# Patient Record
Sex: FEMALE | Race: BLACK OR AFRICAN AMERICAN
[De-identification: names, ages, dates, MRNs, and addresses within clinical notes are randomized per-mention and may not be internally consistent; named-entity substitution may affect disease eponyms.]

---

## 2017-01-15 ENCOUNTER — HOSPITAL ENCOUNTER (EMERGENCY)
Dept: HOSPITAL 74 - JER | Age: 42
LOS: 1 days | Discharge: HOME | End: 2017-01-16
Payer: COMMERCIAL

## 2017-01-15 VITALS — TEMPERATURE: 99.4 F | DIASTOLIC BLOOD PRESSURE: 71 MMHG | HEART RATE: 83 BPM | SYSTOLIC BLOOD PRESSURE: 123 MMHG

## 2017-01-15 VITALS — BODY MASS INDEX: 25.4 KG/M2

## 2017-01-15 DIAGNOSIS — J02.9: ICD-10-CM

## 2017-01-15 DIAGNOSIS — B97.89: ICD-10-CM

## 2017-01-15 DIAGNOSIS — O98.511: Primary | ICD-10-CM

## 2017-01-15 DIAGNOSIS — Z3A.08: ICD-10-CM

## 2017-01-15 PROCEDURE — 3E033NZ INTRODUCTION OF ANALGESICS, HYPNOTICS, SEDATIVES INTO PERIPHERAL VEIN, PERCUTANEOUS APPROACH: ICD-10-PCS | Performed by: EMERGENCY MEDICINE

## 2017-01-16 LAB
ANION GAP SERPL CALC-SCNC: 10 MMOL/L (ref 8–16)
CALCIUM SERPL-MCNC: 8.2 MG/DL (ref 8.5–10.1)
CO2 SERPL-SCNC: 24 MMOL/L (ref 21–32)
CREAT SERPL-MCNC: 0.5 MG/DL (ref 0.55–1.02)
DEPRECATED RDW RBC AUTO: 12.8 % (ref 11.6–15.6)
GLUCOSE SERPL-MCNC: 90 MG/DL (ref 74–106)
MCH RBC QN AUTO: 30 PG (ref 25.7–33.7)
MCHC RBC AUTO-ENTMCNC: 32.6 G/DL (ref 32–36)
MCV RBC: 91.9 FL (ref 80–96)
PLATELET # BLD AUTO: 297 K/MM3 (ref 134–434)
PMV BLD: 9.3 FL (ref 7.5–11.1)
WBC # BLD AUTO: 7.9 K/MM3 (ref 4–10)

## 2017-01-16 NOTE — PDOC
History of Present Illness





- General


Chief Complaint: Respiratory


Stated Complaint: FEVER/ 8 WKS


Time Seen by Provider: 01/15/17 23:33


History Source: Patient


Exam Limitations: No Limitations





- History of Present Illness


Initial Comments: 


01/15/17 23:48


Patient is a 41-year-old female history of seasonal allergies, who is 8 weeks 

pregnant complaining of runny nose, body ache, chills since today. States she 

feels like her eyes are on fire in her ears are burning throbbing pain to the 

throat. States that about 9:00 she took her temperature and it was 100.3 she 

took Tylenol at 6 PM without relief of symptoms. Positive flu shot October. She 

denies any abdominal pain, dysuria, vaginal bleeding.





PMD: Dr. Carias


OBS: Dr. Osorio


PMHx:  As above


PSocHx: Negative cigarette, neg etoh, neg drugs


FamHX:  Noncontributory


ALL:  PCN











GENERAL/CONSTITUTIONAL: [No fever or chills. No weakness. No weight change.]


HEAD, EYES, EARS, NOSE AND THROAT: [No change in vision. No ear pain or 

discharge. No sore throat, (+) rhinorrhea.]


CARDIOVASCULAR: [No chest pain or shortness of breath.]


RESPIRATORY: [No cough, wheezing, or hemoptysis.]


GASTROINTESTINAL: [No nausea, vomiting, diarrhea or constipation. No rectal 

bleeding.]


GENITOURINARY: [No dysuria, frequency, or change in urination.]


MUSCULOSKELETAL: [No joint pain or swelling (+) muscle pain. No neck or back 

pain.]


SKIN AND BREASTS: [No rash or easy bruising.]


NEUROLOGIC: [No headache, vertigo, loss of consciousness, or loss of sensation.]


PSYCHIATRIC: [No depression or anxiety.]


ENDOCRINE: [No increased thirst. No abnormal weight change.]


HEMATOLOGIC/LYMPHATIC: [No anemia, easy bleeding, or history of blood clots.]


ALLERGIC/IMMUNOLOGIC: [No hives or skin allergy. No latex allergy.]








GENERAL: [The patient is awake, alert, and fully oriented, in mild acute 

distress.]


HEAD: [Normal with no signs of trauma.]


EYES: [Pupils equal, round and reactive to light, extraocular movements intact, 

sclera anicteric, conjunctiva clear.]


ENT: [Ears normal, nares patent, oropharynx mild erythema, without exudates.  

Moist mucous membranes.]


NECK: [Normal range of motion, supple without lymphadenopathy, JVD, or masses.]


LUNGS: [Breath sounds equal, clear to auscultation bilaterally.  No wheezes, 

and no crackles.]


HEART: [Regular rate and rhythm, normal S1 and S2 without murmur, rub.]


ABDOMEN: [Soft, nontender, normoactive bowel sounds.  No guarding, no rebound.  

No masses.]


EXTREMITIES: [Normal range of motion, no edema.  No clubbing or cyanosis. No 

cords, erythema, or tenderness.]


NEUROLOGICAL: [Cranial nerves II through XII grossly intact.  Normal speech, 

normal gait.]


PSYCH: [Normal mood, normal affect.]


SKIN: [Warm, Dry, normal turgor, no rashes or lesions noted.]








Past History





- Past Medical History


Allergies/Adverse Reactions: 


 Allergies











Allergy/AdvReac Type Severity Reaction Status Date / Time


 


Penicillins Allergy   Verified 01/15/17 23:15











Home Medications: 


Ambulatory Orders





NK [No Known Home Medication]  01/16/17 








 Disorders: Yes (VAGINAL HERPES)


Suicide Attempt (Hx): No





- Psycho/Social/Smoking Cessation Hx


Suicidal Ideation: No


Smoking History: Never smoked





*Physical Exam





- Vital Signs


 Last Vital Signs











Temp Pulse Resp BP Pulse Ox


 


 99.4 F   83   18   123/71   100 


 


 01/15/17 23:11  01/15/17 23:11  01/15/17 23:11  01/15/17 23:11  01/15/17 23:11














ED Treatment Course





- LABORATORY


CBC & Chemistry Diagram: 


 01/16/17 01:30





 01/16/17 01:30





Medical Decision Making





- Medical Decision Making


01/16/17 01:34


Patient is a 41-year-old female history of seasonal allergies, who is 8 weeks 

pregnant complaining of runny nose, body ache, chills since today. most likely 

viral illness but to rule out the flu


Tylenol 1 g IV, IV fluids, labs.


Reassess





01/16/17 01:39


Rapid strep negative


influenza negative








 Laboratory Tests











  01/16/17 01/16/17





  01:30 01:30


 


WBC  7.9 


 


Hgb  11.5  D 


 


Hct  35.1  D 


 


Plt Count  297 


 


Sodium   138


 


Potassium   3.8


 


Chloride   104


 


Carbon Dioxide   24


 


Anion Gap   10


 


BUN   7


 


Creatinine   0.5 L














Patient feels better after hydration and tylenol 





I discussed the physical exam findings, ancillary test results and final 

diagnoses with the patient. I answered all of the patient's questions. The 

patient was satisfied with the care received and felt comfortable with the 

discharge plan and treatment plan.  The Patient agrees to follow up with the 

primary care physician within 24-72 hours.














*DC/Admit/Observation/Transfer


Diagnosis at time of Disposition: 


 Viral illness





- Discharge Dispostion


Disposition: HOME


Condition at time of disposition: Stable





- Referrals


Referrals: 


Savanna Carias MD [Primary Care Provider] - 





- Patient Instructions


Printed Discharge Instructions:  DI for Viral Pharyngitis


Additional Instructions: 


Your Discharge Instructions:


You must call primary care physician within 24 hours to arrange follow-up.  

Return to the Emergency Department with any new, persistent or worsening 

symptoms, for fever, chills, SOB, dizziness or any other concerning changes 

that may occur. 








- Post Discharge Activity


Work/School Note:  Back to Work

## 2017-08-23 ENCOUNTER — HOSPITAL ENCOUNTER (INPATIENT)
Dept: HOSPITAL 74 - JLDR | Age: 42
LOS: 3 days | Discharge: HOME | End: 2017-08-26
Attending: OBSTETRICS & GYNECOLOGY | Admitting: OBSTETRICS & GYNECOLOGY
Payer: COMMERCIAL

## 2017-08-23 VITALS — BODY MASS INDEX: 27.4 KG/M2

## 2017-08-23 DIAGNOSIS — Z3A.39: ICD-10-CM

## 2017-08-23 LAB
ANION GAP SERPL CALC-SCNC: 10 MMOL/L (ref 8–16)
BASOPHILS # BLD: 0.4 % (ref 0–2)
CALCIUM SERPL-MCNC: 9.2 MG/DL (ref 8.5–10.1)
CO2 SERPL-SCNC: 23 MMOL/L (ref 21–32)
CREAT SERPL-MCNC: 0.6 MG/DL (ref 0.55–1.02)
DEPRECATED RDW RBC AUTO: 14.8 % (ref 11.6–15.6)
EOSINOPHIL # BLD: 0.7 % (ref 0–4.5)
GLUCOSE SERPL-MCNC: 75 MG/DL (ref 74–106)
INR BLD: 0.96 (ref 0.82–1.09)
MCH RBC QN AUTO: 31.2 PG (ref 25.7–33.7)
MCHC RBC AUTO-ENTMCNC: 34.2 G/DL (ref 32–36)
MCV RBC: 91.2 FL (ref 80–96)
NEUTROPHILS # BLD: 57 % (ref 42.8–82.8)
PLATELET # BLD AUTO: 342 K/MM3 (ref 134–434)
PMV BLD: 8.3 FL (ref 7.5–11.1)
PT PNL PPP: 10.6 SEC (ref 9.98–11.88)
WBC # BLD AUTO: 6 K/MM3 (ref 4–10)

## 2017-08-23 PROCEDURE — 3E0P7GC INTRODUCTION OF OTHER THERAPEUTIC SUBSTANCE INTO FEMALE REPRODUCTIVE, VIA NATURAL OR ARTIFICIAL OPENING: ICD-10-PCS | Performed by: OBSTETRICS & GYNECOLOGY

## 2017-08-23 NOTE — HP
Past Medical History





- Admission


Chief Complaint: Elective induction


History of Present Illness: 


42 yo  @ 39 weeks gestation, presents for induction of labor.


EDC 17


She denies any vaginal bleeding nor ROM.


History Source: Patient


Limitations to Obtaining History: No Limitations





- Past Medical History


...: 2


...Para: 1


...Term: 1


...: 0


...Spon : 0


...Induced : 0


...Multiple Gestation: 0


...EDC by Sono: 17





- Past Surgical History


Past Surgical History: Yes: None


Hx Myomectomy: No


Hx Transabdominal Cerclage: No





- Smoking History


Smoking history: Never smoked


Have you smoked in the past 12 months: No





- Alcohol/Substance Use


Hx Alcohol Use: No


History of Substance Use: reports: None





- Social History


Usual Living Arrangement: Yes: With Significant Other


History of Recent Travel: No





Home Medications





- Allergies


Allergies/Adverse Reactions: 


 Allergies











Allergy/AdvReac Type Severity Reaction Status Date / Time


 


Penicillins Allergy   Verified 01/15/17 23:15














- Home Medications


Home Medications: 


Ambulatory Orders





Prenatal Tablet 1 tablet PO DAILY 17 











Family Disease History





- Family Disease History


Family History: Unremarkable





Review of Systems





- Review of Systems


Constitutional: reports: No Symptoms


Eyes: reports: No Symptoms


HENT: reports: No Symptoms


Neck: reports: No Symptoms


Cardiovascular: reports: No Symptoms


Respiratory: reports: No Symptoms


Gastrointestinal: reports: No Symptoms


Genitourinary: reports: No Symptoms


Breasts: reports: No Symptoms Reported


Musculoskeletal: reports: No Symptoms


Integumentary: reports: No Symptoms


Neurological: reports: No Symptoms


Endocrine: reports: No Symptoms


Hematology/Lymphatic: reports: No Symptoms


Psychiatric: reports: No Symptoms


Pain Intensity: 0





Physical Exam - Maternity


Vital Signs: 


 Vital Signs











Temperature  98.2 F   17 22:00


 


Pulse Rate  85   17 22:00


 


Respiratory Rate  18   17 22:00


 


Blood Pressure  115/73   17 22:00


 


O2 Sat by Pulse Oximetry (%)      











Constitutional: Yes: Well Nourished


Eyes: Yes: Conjunctiva Clear


HENT: Yes: Atraumatic


Neck: Yes: Supple


Cardiovascular: Yes: Regular Rate and Rhythm


Lungs: Clear to auscultation





- Abdominal Exam/OB


Number of Fetuses: Single


Fetal Presentation: Vertex


Contractions: No





- Vaginal Exam/OB


Vaginal Bleediing: No


Dilatation (cm): 0


Effacement (%): 60


Fetal Station: -2





- Physical Exam


Musculoskeletal: Yes: WNL


Extremities: Yes: WNL


...Motor Strength: WNL


Psychiatric: Yes: Alert, Oriented





- Labs


Lab Results: 


 CBC, BMP





 17 20:50 





 17 20:50 











Problem List





- Problems


(1) Pregnancy


Code(s): Z34.90 - ENCNTR FOR SUPRVSN OF NORMAL PREGNANCY, UNSP, UNSP TRIMESTER 

  Qualifiers: 


     Weeks of gestation: 39 weeks        Qualified Code(s): Z3A.39 - 39 weeks 

gestation of pregnancy  





(2) Advanced maternal age (AMA) in pregnancy


Code(s): QEQ5929 - 








Assessment/Plan


Advance maternal age in pregnancy


Admit for cervidil induction


Re-evaluate in 12 hours or before if indicated

## 2017-08-24 RX ADMIN — FERROUS SULFATE TAB EC 324 MG (65 MG FE EQUIVALENT) SCH MG: 324 (65 FE) TABLET DELAYED RESPONSE at 17:30

## 2017-08-24 RX ADMIN — DOCUSATE SODIUM,SENNOSIDES PRN TABLET: 50; 8.6 TABLET, FILM COATED ORAL at 21:47

## 2017-08-24 RX ADMIN — CLINDAMYCIN PHOSPHATE SCH MLS/HR: 600 INJECTION, SOLUTION INTRAVENOUS at 12:45

## 2017-08-24 RX ADMIN — CLINDAMYCIN PHOSPHATE SCH: 600 INJECTION, SOLUTION INTRAVENOUS at 18:45

## 2017-08-24 NOTE — PN
Delivery





- Delivery


Vaginal Delivery: Spontaneous


Type of Anesthesia: None


Episiotomy/Laceration: None


EBL (cc): 350





Delivery, Single Birth





- Stages of Labor


Date 1st Stage Initiatied: 17


Time 1st Stage Initiated: 10:00


Date 2nd Stage Initiated: 17


Time 2nd Stage Initiated: 14:00


Date of Delivery: 17


Time of Delivery: 14:21


Time Placenta Delivered: 14:26





- Condition of Infant


Pediatrician/Neonatologist Present: No


Name: Jorge L Anthony


Infant Gender: Male


Position: Right, OA


Total Hours ROM (Hrs/Mins): 4hr/40mins





- Apgar


  ** 1 Minute


Apgar Total Score: 8





  ** 5 Minutes


Apgar Total Score: 9





-  Feeding Plan


Initial Plan: Elected not to breastfeed exclusively throughout hospitalization

## 2017-08-25 LAB
BASOPHILS # BLD: 0.2 % (ref 0–2)
DEPRECATED RDW RBC AUTO: 14.8 % (ref 11.6–15.6)
EOSINOPHIL # BLD: 0.5 % (ref 0–4.5)
MCH RBC QN AUTO: 31.2 PG (ref 25.7–33.7)
MCHC RBC AUTO-ENTMCNC: 34.3 G/DL (ref 32–36)
MCV RBC: 91 FL (ref 80–96)
NEUTROPHILS # BLD: 73.2 % (ref 42.8–82.8)
PLATELET # BLD AUTO: 246 K/MM3 (ref 134–434)
PMV BLD: 7.9 FL (ref 7.5–11.1)
WBC # BLD AUTO: 11.2 K/MM3 (ref 4–10)

## 2017-08-25 RX ADMIN — Medication SCH TAB: at 09:08

## 2017-08-25 RX ADMIN — DOCUSATE SODIUM,SENNOSIDES PRN TABLET: 50; 8.6 TABLET, FILM COATED ORAL at 20:15

## 2017-08-25 RX ADMIN — FERROUS SULFATE TAB EC 324 MG (65 MG FE EQUIVALENT) SCH MG: 324 (65 FE) TABLET DELAYED RESPONSE at 17:46

## 2017-08-25 RX ADMIN — FERROUS SULFATE TAB EC 324 MG (65 MG FE EQUIVALENT) SCH MG: 324 (65 FE) TABLET DELAYED RESPONSE at 11:52

## 2017-08-25 RX ADMIN — FERROUS SULFATE TAB EC 324 MG (65 MG FE EQUIVALENT) SCH MG: 324 (65 FE) TABLET DELAYED RESPONSE at 09:08

## 2017-08-26 VITALS — TEMPERATURE: 98.5 F | HEART RATE: 79 BPM | DIASTOLIC BLOOD PRESSURE: 86 MMHG | SYSTOLIC BLOOD PRESSURE: 121 MMHG

## 2017-08-26 RX ADMIN — FERROUS SULFATE TAB EC 324 MG (65 MG FE EQUIVALENT) SCH MG: 324 (65 FE) TABLET DELAYED RESPONSE at 08:45

## 2017-08-26 RX ADMIN — Medication SCH TAB: at 09:45

## 2017-08-26 RX ADMIN — DOCUSATE SODIUM,SENNOSIDES PRN TABLET: 50; 8.6 TABLET, FILM COATED ORAL at 09:44

## 2017-08-26 NOTE — DS
Physical Exam-GYN


Vital Signs: 


 Vital Signs











Temperature  98.5 F   17 08:05


 


Pulse Rate  79   17 08:05


 


Respiratory Rate  20   17 08:05


 


Blood Pressure  121/86   17 08:05


 


O2 Sat by Pulse Oximetry (%)  98   17 16:31











Constitutional: Yes: Well Nourished


Eyes: Yes: Conjunctiva Clear


HENT: Yes: Atraumatic


Neck: Yes: Supple, Trachea Midline


Cardiovascular: Yes: Regular Rate and Rhythm


Respiratory: Yes: Regular, CTA Bilaterally


Gastrointestinal: Yes: Normal Bowel Sounds


External Genitalia: Yes: Normal


Vaginal Exam: Yes: Normal


....Post Partum: Yes: Uterus firm


Breast(s): Yes: WNL


Neurological: Yes: Alert, Oriented


...Motor Strength: WNL


Psychiatric: Yes: Alert, Oriented


Labs: 


 CBC, BMP





 17 06:00 





 17 20:50 











Delivery





- Delivery


Vaginal Delivery: Spontaneous


Type of Anesthesia: None


Episiotomy/Laceration: None


EBL (cc): 350





Delivery, Single Birth





- Stages of Labor


Date 1st Stage Initiatied: 17


Time 1st Stage Initiated: 10:00


Date 2nd Stage Initiated: 17


Time 2nd Stage Initiated: 14:00


Date of Delivery: 17


Time of Delivery: 14:21


Time Placenta Delivered: 14:26





- Condition of Infant


Pediatrician/Neonatologist Present: No


Name: Jorge L Anthony


Infant Gender: Male


Birth Weight: 8 lb 6 oz


Position: Right, OA


Total Hours ROM (Hrs/Mins): 4hr/40mins





- Apgar


  ** 1 Minute


Apgar Total Score: 8





  ** 5 Minutes


Apgar Total Score: 9





-  Feeding Plan


Initial Plan: Elected not to breastfeed exclusively throughout hospitalization





Remarks





- Remarks


Remarks: 


Normal spontaneous vaginal delivery of a live infant boy over intact perineum.


Nose / Oropharynx suctioned @ perineum.


Cord clamped and cut.


Placenta expelled spontaneously intact.


Mother in stable condition.





Discharge Summary


Reason For Visit: INDUCTION


Current Active Problems





Advanced maternal age (AMA) in pregnancy (Acute) 


Anemia (Acute) 


Anemia complicating puerperium (Acute) 


Normal vaginal delivery (Acute) 


Pregnancy (Acute) 


Status post normal vaginal delivery (Acute) 








Procedures: Principal: Normal spontaneous vaginal delivery


Hospital Course: 


Routine postpartum care


Condition: Good





- Instructions


Diet, Activity, Other Instructions: 


return to MD office in 6weeks, call office for an appointment.


Referrals: 


Kellie Coffman MD [Staff Physician] - 


Disposition: HOME





- Home Medications


Comprehensive Discharge Medication List: 


Ambulatory Orders





Prenatal Tablet 1 tablet PO DAILY 17

## 2018-11-12 ENCOUNTER — HOSPITAL ENCOUNTER (INPATIENT)
Dept: HOSPITAL 74 - JLDR | Age: 43
LOS: 3 days | Discharge: HOME | End: 2018-11-15
Attending: OBSTETRICS & GYNECOLOGY | Admitting: OBSTETRICS & GYNECOLOGY
Payer: COMMERCIAL

## 2018-11-12 VITALS — BODY MASS INDEX: 28.7 KG/M2

## 2018-11-12 DIAGNOSIS — Z3A.37: ICD-10-CM

## 2018-11-12 LAB
ANION GAP SERPL CALC-SCNC: 9 MMOL/L (ref 8–16)
APTT BLD: 28.4 SECONDS (ref 25.2–36.5)
BASOPHILS # BLD: 0.2 % (ref 0–2)
BUN SERPL-MCNC: 5 MG/DL (ref 7–18)
CALCIUM SERPL-MCNC: 8.2 MG/DL (ref 8.5–10.1)
CHLORIDE SERPL-SCNC: 106 MMOL/L (ref 98–107)
CO2 SERPL-SCNC: 24 MMOL/L (ref 21–32)
CREAT SERPL-MCNC: 0.4 MG/DL (ref 0.55–1.3)
DEPRECATED RDW RBC AUTO: 14.4 % (ref 11.6–15.6)
EOSINOPHIL # BLD: 0.7 % (ref 0–4.5)
GLUCOSE SERPL-MCNC: 95 MG/DL (ref 74–106)
HCT VFR BLD CALC: 33.1 % (ref 32.4–45.2)
HGB BLD-MCNC: 11.3 GM/DL (ref 10.7–15.3)
INR BLD: 1.02 (ref 0.83–1.09)
LYMPHOCYTES # BLD: 31.4 % (ref 8–40)
MCH RBC QN AUTO: 31.6 PG (ref 25.7–33.7)
MCHC RBC AUTO-ENTMCNC: 34.3 G/DL (ref 32–36)
MCV RBC: 92.2 FL (ref 80–96)
MONOCYTES # BLD AUTO: 7.1 % (ref 3.8–10.2)
NEUTROPHILS # BLD: 60.6 % (ref 42.8–82.8)
PLATELET # BLD AUTO: 324 K/MM3 (ref 134–434)
PMV BLD: 8.3 FL (ref 7.5–11.1)
POTASSIUM SERPLBLD-SCNC: 3.6 MMOL/L (ref 3.5–5.1)
PT PNL PPP: 12 SEC (ref 9.7–13)
RBC # BLD AUTO: 3.59 M/MM3 (ref 3.6–5.2)
SODIUM SERPL-SCNC: 139 MMOL/L (ref 136–145)
WBC # BLD AUTO: 6.2 K/MM3 (ref 4–10)

## 2018-11-12 PROCEDURE — G0008 ADMIN INFLUENZA VIRUS VAC: HCPCS

## 2018-11-12 NOTE — HP
Past Medical History





- Admission


Chief Complaint: Contractions


History of Present Illness: 





42 yo  @ 37.6 weeks gestation, EDC 18, admitted for contractions pain.


Upon admission she was 5cm dilated.


History Source: Patient


Limitations to Obtaining History: No Limitations





- Past Medical History


...: 3


...Para: 2


...Term: 2


...: 0


...Spon : 0


...Induced : 0


...Multiple Gestation: 0


...LMP: 18


... Weeks Gestation by Dates: 38.2


...EDC by Dates: 18


...EDC by Sono: 18





- Past Surgical History


Past Surgical History: Yes: None


Hx Myomectomy: No


Hx Transabdominal Cerclage: No





- Smoking History


Smoking history: Unknown if ever smoked


Have you smoked in the past 12 months: No





- Alcohol/Substance Use


Hx Alcohol Use: No


History of Substance Use: reports: None





- Social History


Usual Living Arrangement: Yes: With Significant Other


History of Recent Travel: No





Home Medications





- Allergies


Allergies/Adverse Reactions: 


 Allergies











Allergy/AdvReac Type Severity Reaction Status Date / Time


 


Penicillins Allergy   Verified 18 18:37














- Home Medications


Home Medications: 


Ambulatory Orders





Prenatal Tablet 1 tablet PO DAILY 17 











Family Disease History





- Family Disease History


Family History: Unremarkable





Review of Systems





- Review of Systems


Constitutional: reports: No Symptoms


Eyes: reports: No Symptoms


Neck: reports: No Symptoms


Cardiovascular: reports: No Symptoms


Respiratory: reports: No Symptoms


Gastrointestinal: reports: No Symptoms


Genitourinary: reports: Pain


Breasts: reports: No Symptoms Reported


Musculoskeletal: reports: No Symptoms


Integumentary: reports: No Symptoms


Neurological: reports: No Symptoms


Endocrine: reports: No Symptoms


Hematology/Lymphatic: reports: No Symptoms


Psychiatric: reports: No Symptoms


Pain Intensity: 5





Physical Exam - Maternity


Vital Signs: 


 Vital Signs











Temperature  98.2 F   18 17:45


 


Pulse Rate  84   18 20:00


 


Respiratory Rate  20   18 20:00


 


Blood Pressure  112/51 L  18 20:00


 


O2 Sat by Pulse Oximetry (%)      











Constitutional: Yes: Well Nourished


Eyes: Yes: Conjunctiva Clear


HENT: Yes: Atraumatic


Neck: Yes: Supple


Cardiovascular: Yes: Regular Rate and Rhythm


Lungs: Clear to auscultation





- Abdominal Exam/OB


Number of Fetuses: Single


Fetal Presentation: Vertex


Contractions: No


Regularity: Irregular


Intensity: Mild





- Vaginal Exam/OB


Dilatation (cm): 5


Effacement (%): 80


Amniotic Membrane Status: Intact


Fetal Station: -2





- Physical Exam


Musculoskeletal: Yes: WNL


Extremities: Yes: WNL


...Motor Strength: WNL


Psychiatric: Yes: Alert, Oriented





- Labs


Lab Results: 


 CBC, BMP





 18 19:20 





 18 19:20 











Problem List





- Problems


(1) Pain during labor


Code(s): O99.89 - OTH DISEASES AND CONDITIONS COMPL PREG/CHLDBRTH; R52 - PAIN, 

UNSPECIFIED   





Assessment/Plan





Labor pain


Admit to L&D


Analgesia as needed


anticipate

## 2018-11-13 RX ADMIN — Medication SCH TAB: at 10:00

## 2018-11-13 RX ADMIN — FERROUS SULFATE TAB EC 324 MG (65 MG FE EQUIVALENT) SCH MG: 324 (65 FE) TABLET DELAYED RESPONSE at 17:46

## 2018-11-13 RX ADMIN — FERROUS SULFATE TAB EC 324 MG (65 MG FE EQUIVALENT) SCH MG: 324 (65 FE) TABLET DELAYED RESPONSE at 08:00

## 2018-11-13 RX ADMIN — FERROUS SULFATE TAB EC 324 MG (65 MG FE EQUIVALENT) SCH MG: 324 (65 FE) TABLET DELAYED RESPONSE at 12:18

## 2018-11-13 NOTE — PN
Delivery





- Delivery


Vaginal Delivery: Spontaneous


Episiotomy/Laceration: None


EBL (cc): 250





Delivery, Single Birth





-  Feeding Plan


Initial Plan: Elected not to breastfeed exclusively throughout hospitalization





Remarks





- Remarks


Remarks: 





Normal spontaneous vaginal delivery of a live infant boy over intact perineum.


Nose / Oropharynx suctioned @ perineum.


Cord clamped and cut.


Placenta expelled spontaneously intact.


Baby handed to nurse.


Mother in stable condition.

## 2018-11-14 LAB
BASOPHILS # BLD: 0.3 % (ref 0–2)
DEPRECATED RDW RBC AUTO: 14.8 % (ref 11.6–15.6)
EOSINOPHIL # BLD: 1 % (ref 0–4.5)
HCT VFR BLD CALC: 29.4 % (ref 32.4–45.2)
HGB BLD-MCNC: 9.7 GM/DL (ref 10.7–15.3)
LYMPHOCYTES # BLD: 26.7 % (ref 8–40)
MCH RBC QN AUTO: 30.8 PG (ref 25.7–33.7)
MCHC RBC AUTO-ENTMCNC: 33.1 G/DL (ref 32–36)
MCV RBC: 93 FL (ref 80–96)
MONOCYTES # BLD AUTO: 6 % (ref 3.8–10.2)
NEUTROPHILS # BLD: 66 % (ref 42.8–82.8)
PLATELET # BLD AUTO: 265 K/MM3 (ref 134–434)
PMV BLD: 8.3 FL (ref 7.5–11.1)
RBC # BLD AUTO: 3.16 M/MM3 (ref 3.6–5.2)
WBC # BLD AUTO: 8.1 K/MM3 (ref 4–10)

## 2018-11-14 RX ADMIN — FERROUS SULFATE TAB EC 324 MG (65 MG FE EQUIVALENT) SCH MG: 324 (65 FE) TABLET DELAYED RESPONSE at 17:54

## 2018-11-14 RX ADMIN — FERROUS SULFATE TAB EC 324 MG (65 MG FE EQUIVALENT) SCH MG: 324 (65 FE) TABLET DELAYED RESPONSE at 08:15

## 2018-11-14 RX ADMIN — FERROUS SULFATE TAB EC 324 MG (65 MG FE EQUIVALENT) SCH: 324 (65 FE) TABLET DELAYED RESPONSE at 12:20

## 2018-11-14 RX ADMIN — Medication SCH TAB: at 09:54

## 2018-11-15 VITALS — TEMPERATURE: 98.7 F

## 2018-11-15 VITALS — SYSTOLIC BLOOD PRESSURE: 138 MMHG | HEART RATE: 90 BPM | DIASTOLIC BLOOD PRESSURE: 92 MMHG

## 2018-11-15 RX ADMIN — FERROUS SULFATE TAB EC 324 MG (65 MG FE EQUIVALENT) SCH MG: 324 (65 FE) TABLET DELAYED RESPONSE at 08:02

## 2018-11-15 RX ADMIN — Medication SCH TAB: at 09:02

## 2018-11-15 NOTE — DS
Physical Exam-GYN


Vital Signs: 


 Vital Signs











Temperature  98.7 F   18 21:00


 


Pulse Rate  80   18 21:00


 


Respiratory Rate  18   18 21:00


 


Blood Pressure  118/74   18 21:00


 


O2 Sat by Pulse Oximetry (%)      











Labs: 


 CBC, BMP





 18 06:30 





 18 19:20 











Delivery





- Delivery


Vaginal Delivery: Spontaneous


Type of Anesthesia: None


Episiotomy/Laceration: None


EBL (cc): 250





Delivery, Single Birth





- Stages of Labor


Date 1st Stage Initiatied: 18


Time 1st Stage Initiated: 22:15


Date 2nd Stage Initiated: 18


Time 2nd Stage Initiated: 00:10


Date of Delivery: 18


Time of Delivery: 00:22


Time Placenta Delivered: 00:24





- Condition of Infant


Pediatrician/Neonatologist Present: No


Infant Gender: Male


Birth Weight: 6 lb 11.8 oz


Position: Left, OA


Total Hours ROM (Hrs/Mins): 3HOURS/4min





- Apgar


  ** 1 Minute


Apgar Total Score: 9





  ** 5 Minutes


Apgar Total Score: 9





-  Feeding Plan


Initial Plan: Elected not to breastfeed exclusively throughout hospitalization





Discharge Summary


Reason For Visit: LABOR


Current Active Problems





Pain during labor (Acute)








Condition: Good





- Instructions


Diet, Activity, Other Instructions: 





Physical activity





Resume your normal everyday activity as tolerated no heavy lifting or exercise 

until seen by your doctor. You may walk unlimited amounts and climb stairs. You 

may resume driving the car when you feel safe and comfortable behind the wheel. 

No sexual activity as instructed for 6 weeks. 





Wound care


If you have stitches, they will dissolve.   DO NOT attempt to remove them.   

You may shower daily, do not soak in tubs/baths/pools for 6 weeks. 





Diet


There are no dietary restrictions. Eat healthy, high-fiber foods. Drink 6 to 8 

glasses of liquid each day. This will assist in keeping your bowels regular.





Pain management


You may take Tylenol or Ibuprofen (for example, Motrin, Advil etc.) as needed 

for pain. 





Call MD for any of the following:





Severe pain not relieved by medication


Fever of 101 or higher


Excessive bleeding or drainage on dressing


Inability to urinate

















Disposition: HOME





- Home Medications


Comprehensive Discharge Medication List: 


Ambulatory Orders





Prenatal Tablet 1 tablet PO DAILY 17

## 2019-01-31 ENCOUNTER — HOSPITAL ENCOUNTER (OUTPATIENT)
Dept: HOSPITAL 74 - JASU-SURG | Age: 44
Discharge: HOME | End: 2019-01-31
Attending: OBSTETRICS & GYNECOLOGY
Payer: COMMERCIAL

## 2019-01-31 VITALS — HEART RATE: 66 BPM | DIASTOLIC BLOOD PRESSURE: 89 MMHG | SYSTOLIC BLOOD PRESSURE: 134 MMHG

## 2019-01-31 VITALS — BODY MASS INDEX: 25.7 KG/M2

## 2019-01-31 VITALS — TEMPERATURE: 97.9 F

## 2019-01-31 DIAGNOSIS — Z30.2: Primary | ICD-10-CM

## 2019-01-31 PROCEDURE — 0UL74ZZ OCCLUSION OF BILATERAL FALLOPIAN TUBES, PERCUTANEOUS ENDOSCOPIC APPROACH: ICD-10-PCS | Performed by: OBSTETRICS & GYNECOLOGY

## 2019-01-31 NOTE — OP
Operative Note





- Note:


Operative Date: 01/31/19


Pre-Operative Diagnosis: Multiparity


Operation: Laparoscopic bilateral tubal ligation


Post-Operative Diagnosis: Same as Pre-op


Surgeon: Kellie Coffman


Anesthesia: General


Estimated Blood Loss (mls): 3

## 2019-01-31 NOTE — HP
Admitting History and Physical





- Admission


Chief Complaint: Multiparity


History of Present Illness: 





42 yo Para 3 is pre op for lap bilateral tubal ligation.


History Source: Patient


Limitations to Obtaining History: No Limitations





- Past Medical History


...LMP: 01/17/19


...Pregnant: No


...Para: 3





- Past Surgical History


Past Surgical History: Yes: None





- Smoking History


Smoking history: Never smoked


Have you smoked in the past 12 months: No





- Alcohol/Substance Use


Hx Alcohol Use: No


History of Substance Use: reports: None





- Social History


History of Recent Travel: No





Home Medications





- Allergies


Allergies/Adverse Reactions: 


 Allergies











Allergy/AdvReac Type Severity Reaction Status Date / Time


 


Penicillins Allergy Unknown  Verified 01/31/19 13:30














- Home Medications


Home Medications: 


Ambulatory Orders





Prenatal Vit 93/Iron Fum/Folic [Prenatal Formula Tablet] 1 each PO DAILY 01/23/ 19 











Family Disease History





- Family Disease History


Family History: Unremarkable





Review of Systems





- Review of Systems


Constitutional: reports: No Symptoms


Eyes: reports: No Symptoms


HENT: reports: No Symptoms


Neck: reports: No Symptoms


Cardiovascular: reports: No Symptoms


Respiratory: reports: No Symptoms


Gastrointestinal: reports: No Symptoms


Genitourinary: reports: No Symptoms


Breasts: reports: No Symptoms Reported


Musculoskeletal: reports: No Symptoms


Integumentary: reports: No Symptoms


Neurological: reports: No Symptoms


Endocrine: reports: No Symptoms


Psychiatric: reports: No Symptoms


Pain Intensity: 0





Physical Examination


Vital Signs: 


 Vital Signs











Temperature  98.2 F   01/31/19 13:33


 


Pulse Rate  68   01/31/19 13:33


 


Respiratory Rate  16   01/31/19 13:33


 


Blood Pressure  134/93   01/31/19 13:33


 


O2 Sat by Pulse Oximetry (%)  99   01/31/19 13:33











Constitutional: Yes: Well Nourished


Eyes: Yes: Conjunctiva Clear


HENT: Yes: Atraumatic


Neck: Yes: Supple


Cardiovascular: Yes: Regular Rate and Rhythm


Respiratory: Yes: Regular


Gastrointestinal: Yes: Normal Bowel Sounds


Breast(s): Yes: WNL


Musculoskeletal: Yes: WNL


Extremities: Yes: WNL


Neurological: Yes: Alert, Oriented


...Motor Strength: WNL


Psychiatric: Yes: Alert, Oriented





Problem List





- Problems


(1) Multiparity


Code(s): Z64.1 - PROBLEMS RELATED TO MULTIPARITY   





Assessment/Plan





Multiparity


Pre op for Lap BTL


Consent signed


Anesthesia to see patient

## 2019-02-01 NOTE — SURG
Surgery First Assist Note


First Assist: Fuad Dhillon PA-C


Date of Service: 01/31/19


Diagnosis: 


Elective sterilization





Procedure: 


laproscopic salpingectomy





I was present for the entirety of the operative procedure. For further detail, 

please refer to operative report.

## 2019-02-02 NOTE — OP
DATE OF OPERATION:  

 

DATE OF DICTATION:  02/01/2019

 

PREOPERATIVE DIAGNOSIS:  Multiparity, desires permanent sterilization.

 

POSTOPERATIVE DIAGNOSIS:  Multiparity, desires permanent sterilization.

 

PROCEDURE:  Laparoscopic bilateral tubal ligation.

 

SURGEON:  Kellie Coffman MD 

 

ASSISTANT:  Fuad 

 

ANESTHESIA:  General.

 

COMPLICATIONS:  None.

 

ESTIMATED BLOOD LOSS:  Less than 5 mL.

 

DESCRIPTION OF PROCEDURE:  The patient was taken to the operating room where general

anesthesia was administered.  The patient was then placed in lithotomy position.  She

was then prepped and draped in proper sterile fashion.  A weighted speculum was

placed in the vagina.  The anterior lip of the cervix was grasped with a single-tooth

tenaculum.  Then the Castañeda was placed and a HUMI uterine manipulator was inserted

into the uterus as a means to manipulate the uterus.  Attention was then turned to

the abdomen where a 5-mm skin incision was made in the umbilical fold.  A Veress

needle was carefully introduced into the peritoneal cavity while tenting the

abdominal wall.  Intraperitoneal placement was confirmed by use of a water-filled

syringe and drop in intra-abdominal pressure with insufflation of CO2 gas.  The

trocar and sleeve were then advanced without difficulty into the abdomen where

intra-abdominal placement was confirmed by the laparoscope.  Pneumoperitoneum was

obtained with 3 L of CO2 gas.  Then a 5-mm trocar and sleeve were then advanced

without difficulty into the abdomen where intra-abdominal placement was confirmed by

the laparoscope.  The 2nd skin incision was made 2 cm above the pubic symphysis in

the midline.  The 2nd trocar and sleeve were then advance under direct visualization.

 A survey of the patient's abdomen revealed normal anatomy.  A Kleppinger was used to

grasp the right tube.  A 3-cm segment of the tube was then cauterized in 3 separate

areas.  Complete blanching of the portion of the tube was noted.  This same procedure

was performed on the left side.  Then the instruments were removed.  The patient was

taken out of lithotomy position.  She was taken to PACU in stable condition.  

 

 

KELLIE COFFMAN M.D.

 

ROXANNA/[id number]

DD: 02/01/2019 21:35

DT: 02/02/2019 06:30

Job #:  [id number]

## 2024-08-05 ENCOUNTER — HOSPITAL ENCOUNTER (EMERGENCY)
Dept: HOSPITAL 74 - JERFT | Age: 49
Discharge: HOME | End: 2024-08-05
Payer: COMMERCIAL

## 2024-08-05 VITALS
HEART RATE: 66 BPM | SYSTOLIC BLOOD PRESSURE: 129 MMHG | DIASTOLIC BLOOD PRESSURE: 84 MMHG | RESPIRATION RATE: 18 BRPM | TEMPERATURE: 98.3 F

## 2024-08-05 VITALS — BODY MASS INDEX: 25.7 KG/M2

## 2024-08-05 DIAGNOSIS — M54.2: ICD-10-CM
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
## 2024-08-05 DIAGNOSIS — M54.6: ICD-10-CM

## 2024-08-05 DIAGNOSIS — M54.50: Primary | ICD-10-CM
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
## 2024-08-05 PROCEDURE — 3E0233Z INTRODUCTION OF ANTI-INFLAMMATORY INTO MUSCLE, PERCUTANEOUS APPROACH: ICD-10-PCS

## 2024-08-05 RX ADMIN — ACETAMINOPHEN ONE MG: 500 TABLET, FILM COATED ORAL at 18:07

## 2024-08-05 RX ADMIN — KETOROLAC TROMETHAMINE ONE MG: 30 INJECTION INTRAMUSCULAR; INTRAVENOUS at 18:07

## 2024-08-05 RX ADMIN — LIDOCAINE PATCH 4% ONE PATCH: 40 PATCH TOPICAL at 18:06

## 2025-06-12 NOTE — PN
Outpatient Rehab    Physical Therapy Visit    Patient Name: Erin Johnson  MRN: [id number]  YOB: 1965  Encounter Date: 6/12/2025    Therapy Diagnosis:   Encounter Diagnoses   Name Primary?    Primary osteoarthritis of right knee Yes    Difficulty walking     Chronic pain of right knee      Physician: Babatunde Cotto MD    Physician Orders: Eval and Treat  Medical Diagnosis: Unilateral primary osteoarthritis, right knee  Surgical Diagnosis: Right TKA   Surgical Date: 5/6/2025    Visit # / Visits Authorized:  15 / 20  Insurance Authorization Period: 3/26/2025 to 12/31/2025  Date of Evaluation: 5/8/2025  Plan of Care Certification: 5/8/2025 to 6/20/2025      PT/PTA:     Number of PTA visits since last PT visit:   Time In: 1247   Time Out: 1348  Total Time (in minutes): 61   Total Billable Time (in minutes): 61    FOTO:  Intake Score:  %  Survey Score 2:  %  Survey Score 3:  %    Precautions:       Subjective   Pt states that she has been feeling lightheaded on a regular basis since surgery. She says she has been going to the gym to ride the bike for endurance training..  Pain reported as 0/10.      Objective            Treatment:  Balance/Neuromuscular Re-Education  NMR 4: Forward and lateral stepping over small (6-inch) hurdles, 5 laps each along countertop  NMR 7: standing marching, 3 x 10  NMR 8: Standing hip abduction with yellow TB, 3 x 10  NMR 10: Standing knee flexion, 3 x 10  Therapeutic Activity  TA 1: Education on TKA rehab, signs/sx of infection/DVT, edema management, quad function, NMES  TA 2: Recumbent Bike for 10 minutes/1 mile, Lvl 1.5 (seat 17)  TA 3: step ups onto 6-inch step: 3 x 10 reps -- eccentric lateral step down from 4-inch step: 3 x 10 reps  TA 5: DL shuttle press, 62.5 lbs, 3 x 10 reps -- SL shuttle press, 25 lbs, 3 x 10 reps    Time Entry(in minutes):  Neuromuscular Re-Education Time Entry: 23  Therapeutic Activity Time Entry: 38    Assessment & Plan   Assessment: Pt  Post Partum Progress Note





- Subjective


Subjective: 


Pt seen/evaluated and doing well.  Pain controlled, tolerating diet, ambulating

, voiding, passing flatus. 





Type of Delivery: 


Vital Signs: 


 Vital Signs











Temperature  98.0 F   17 06:00


 


Pulse Rate  74   17 06:00


 


Respiratory Rate  20   17 06:00


 


Blood Pressure  121/74   17 06:00


 


O2 Sat by Pulse Oximetry (%)  98   17 16:31











Breast Exam: Yes: Soft


Uterus: Yes: Fundus Firm, Fundus below umbilicus


Abdomen/GI: Yes: Abdomen soft, Passing flatus, Tolerating PO.  No: Tender


Lochia: Yes: Rubra


Lochia, amount: Small


Extremities: Yes: Calves non-tender.  No: Edema


Perineum: Yes: Intact


Activity: Ambulating





- Labs


Labs: 


 CBC











WBC  11.2 K/mm3 (4.0-10.0)  H D 17  06:00    


 


RBC  2.83 M/mm3 (3.60-5.2)  L D 17  06:00    


 


Hgb  8.8 GM/dL (10.7-15.3)  L D 17  06:00    


 


Hct  25.8 % (32.4-45.2)  L D 17  06:00    


 


MCV  91.0 fl (80-96)   17  06:00    


 


MCH  31.2 pg (25.7-33.7)   17  06:00    


 


MCHC  34.3 g/dl (32.0-36.0)   17  06:00    


 


RDW  14.8 % (11.6-15.6)   17  06:00    


 


Plt Count  246 K/MM3 (134-434)  D 17  06:00    


 


MPV  7.9 fl (7.5-11.1)   17  06:00    


 


Neutrophils %  73.2 % (42.8-82.8)  D 17  06:00    


 


Lymphocytes %  20.0 % (8-40)  D 17  06:00    


 


Monocytes %  6.1 % (3.8-10.2)   17  06:00    


 


Eosinophils %  0.5 % (0-4.5)   17  06:00    


 


Basophils %  0.2 % (0-2.0)   17  06:00    














Problem List





- Problems


(1) Normal vaginal delivery


Code(s): O80 - ENCOUNTER FOR FULL-TERM UNCOMPLICATED DELIVERY





(2) Anemia


Code(s): D64.9 - ANEMIA, UNSPECIFIED   





(3) Anemia complicating puerperium


Code(s): O99.03 - ANEMIA COMPLICATING THE PUERPERIUM








Assessment/Plan


40 y/o PPD#1 s/p normal 


- AFVSS


- Hgb 8.8 post delivery ,continue prenatal vitamins and PO Iron


- regular diet, PO pain meds, routine care tolerated session well. She is reporting daily lightheadedness which increased with exertion. She says this has been present since TKA surgery. Pt had appropriate muscle response to all interventions this visit. She is progressing well towards established goals.       The patient will continue to benefit from skilled outpatient physical therapy in order to address the deficits listed in the problem list on the initial evaluation, provide patient and family education, and maximize the patients level of independence in the home and community environments.     The patient's spiritual, cultural, and educational needs were considered, and the patient is agreeable to the plan of care and goals.           Plan: Continue with established POC.    Goals:   Active       Ambulation/movement       Patient will walk with community distances with normal gait pattern in order to demonstrate improved functional ability.  (Progressing)       Start:  05/09/25    Expected End:  06/20/25            Patient will navigate 1 flight of stairs without difficulty in order to demonstrate improved functional ability.  (Progressing)       Start:  05/09/25    Expected End:  06/20/25               Changing body position       Patient will demonstrate moving sit to stand 12 reps in 30 seconds in order to demonstrate improved transfer ability.  (Progressing)       Start:  05/09/25    Expected End:  06/20/25               Functional outcome       Patient will demonstrate independence in home program for support of progression (Progressing)       Start:  05/09/25    Expected End:  06/06/25               Home activities       Patient will perform household indoor maintenance without difficulty in order to demonstrate improved functional ability.  (Progressing)       Start:  05/09/25    Expected End:  06/20/25               Range of Motion       Patient will achieve right knee ROM of  0-120 degrees in order to improve gait and transfer ability.   (Progressing)       Start:  05/09/25    Expected End:  06/20/25               Strength       Patient will achieve right knee flexion strength of >/= 4+/5 in order to demonstrate improved knee stability.  (Progressing)       Start:  05/09/25    Expected End:  06/20/25            Patient will achieve right knee extension strength of >/= 4+/5 in order to demonstrate improved knee stability.  (Progressing)       Start:  05/09/25    Expected End:  06/20/25                Jones Ritchie, PT, DPT